# Patient Record
(demographics unavailable — no encounter records)

---

## 2025-03-05 NOTE — ASSESSMENT
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   New Mom - Amanda 12/22... doing great working PT  and happy with this   1) pSS:  SHAYY 1:640S w/ + SSA > 8.0 and low + RF 52, with ESR 59 and SPEP + polyclonal gammopathy with all other antibodies neg to include APS / LAC.  Presents with years of KCS (moderate + response to Xiidra w/ no recent inflammatory changes or abrasions), + sicca  mild with intermittent salivary/ submandibular and parotid gland enlargement (mild persisten- not active now) as well as recurrent (mild persistent) lymphadenopathy (not appreciated today), and most concerning evidence of RTA w/ recurrent renal calculi with mild persistent metabolic acidosis CO2 most recently 20-21..Most recent CO2 was 24 (11/23 stable).. doing better overall through change in stressors...and caring for self better.. marked improvement in diffuse mild arthralgias- Does not report obvious synovitis now or in past.    Significantly  denies mucositis in past though recent increase in oral apthous ulcers.. tolerable, no serositis, rashes, neuropathies with no fevers, chills. or GI sx.   Continues on HCQ (200 mg /d = 3.6 mg/kg) with last eye exam 6/23 ok to continue and check in 1yr   NOTE:  - Lymphadenopathy: reports hx of of it in axillary region and saw Heme/onc and was told most likely benign and can go for biopsy if prefers & states stable on recent US   2) Infertility: she is on Plaquenil but despite 3 ys of continued HCQ... still  unable to conceive.  Will not retry  3) Regional pain: migratory.. nothing overt at this time -  Lt trochanteric bursitis: excellent response  - R sided RTC pathology with POM but fROM.. in PT + response, doing better   - LBP: intermittent, not active at this time..  -Xray of LS spine to evaluate for structural changes, DDD, confirm SI normal  -Advil PRN w/ food needed sparingly as needed    Plan  - biotene mouthwash or toothpaste or spray PRN continue   - Complete labs every 6 months -orders on file before next visit   - Continue to take plaquenil as prescribed low dose 200 mg once daily  for now no need to increase   - renewing HCQ next eye exam is 6/2024- update needed annlually   - Call if symptoms change.  - All questions and concerns addressed to my best possible ability, verbally acknowledged understanding is agreeable with plan as stated  - rto in 6 months

## 2025-03-05 NOTE — PHYSICAL EXAM
[General Appearance - Alert] : alert [General Appearance - In No Acute Distress] : in no acute distress [General Appearance - Well Nourished] : well nourished [General Appearance - Well Developed] : well developed [General Appearance - Well-Appearing] : healthy appearing [Sclera] : the sclera and conjunctiva were normal [PERRL With Normal Accommodation] : pupils were equal in size, round, and reactive to light [Extraocular Movements] : extraocular movements were intact [Outer Ear] : the ears and nose were normal in appearance [Neck Appearance] : the appearance of the neck was normal [Respiration, Rhythm And Depth] : normal respiratory rhythm and effort [Exaggerated Use Of Accessory Muscles For Inspiration] : no accessory muscle use [Auscultation Breath Sounds / Voice Sounds] : lungs were clear to auscultation bilaterally [Heart Rate And Rhythm] : heart rate was normal and rhythm regular [Heart Sounds] : normal S1 and S2 [Heart Sounds Gallop] : no gallops [Murmurs] : no murmurs [Edema] : there was no peripheral edema [Cervical Lymph Nodes Enlarged Posterior Bilaterally] : posterior cervical [Cervical Lymph Nodes Enlarged Anterior Bilaterally] : anterior cervical [Supraclavicular Lymph Nodes Enlarged Bilaterally] : supraclavicular [No CVA Tenderness] : no ~M costovertebral angle tenderness [No Spinal Tenderness] : no spinal tenderness [] : no rash [Sensation] : the sensory exam was normal to light touch and pinprick [Motor Exam] : the motor exam was normal [No Focal Deficits] : no focal deficits [Impaired Insight] : insight and judgment were intact [Mood] : the mood was normal [Motor Tone] : muscle strength and tone were normal [FreeTextEntry1] : ; no synovitis or effusion on exam noted today- fROM throughout w/ no tender points or deformities

## 2025-03-05 NOTE — REVIEW OF SYSTEMS
[Feeling Tired] : feeling tired [Dry Eyes] : dryness of the eyes [As Noted in HPI] : as noted in HPI [Arthralgias] : arthralgias [Joint Pain] : joint pain [Joint Stiffness] : joint stiffness [Sleep Disturbances] : sleep disturbances [Negative] : Heme/Lymph [Fever] : no fever [Chills] : no chills [Eye Pain] : no eye pain [Red Eyes] : eyes not red [Nosebleeds] : no nosebleeds [Chest Pain] : no chest pain [Shortness Of Breath] : no shortness of breath [Abdominal Pain] : no abdominal pain [Dysuria] : no dysuria [Confused] : no confusion [Suicidal] : not suicidal [Anxiety] : no anxiety [Muscle Weakness] : no muscle weakness [Easy Bleeding] : no tendency for easy bleeding [FreeTextEntry2] : , energy level excellent routinely- better with new job and aging of her child, stable wt  [FreeTextEntry3] : Xiidra routinely-> PRN now(+ response) .  Rare corneal abrasions, nothing recently and denies inflammatory eye disease  [FreeTextEntry4] : sicca- oral mild w/ intermittent salivary/ parotid gland enlargement- mild persistent and recent increase in apthous ulcers.. oral only  [FreeTextEntry8] : RTA in past with renal calculi multple [FreeTextEntry9] : joint pain improved with no previous or current swelling- minimal stiffness less than 10 mins  [de-identified] : r/t ..- doing better overall

## 2025-03-05 NOTE — HISTORY OF PRESENT ILLNESS
[FreeTextEntry1] : 3/5/25 - since lat visit again another episode of severe renal calculi...required adjustmentin citrate .. now doing well.  NO acute obstruction this time.   updated labs 1/25 nl CBC, CMP w/ CO2 28, TSH, and UA - tr blood only.  - COntinues to live with LDN... but stable - still most persistent axillary.  no night sweats, fevers, chills or wt loss  - exercsing more.. work less stressful- PT ROS:  lives with chronic mild diffuse arthralgias but no overt inflammation, aMS < 10 mins, no muscle weakness.  MIld persistent oral/ opth dryness- no routine medication needed.  no cardiopulmonary sx, denies paresthesias   - kidney stone update: had 24 hr urine redone and everything was good and has since switched her care to a urologist  - seeing urologist Dr. Martinez Singh at [facility] who will monitor her status and requires 24 hr urine on a regular basis  - has a f/u w/ uro every 6 months  - on potassium citrate 20 MEQs BID - Regimen well tolerated, no SE/ TOx.... last eye exam 6.23 no contraindications to continued use.  Never did start cevimiline, wants to minimize medications   1) pSS 2) Regional pain:  L sided GTB R shoulder   ___________________________________________________________________________ Initial HPI 12/14/2022  37 year old female w/history of elevated SHAYY, + schirmer test, + Sjogren antibodies, lymphadenopathy, dry eyes, dry mouth, presenting in office to establish care w/ new rheumatologist. First discovered elevated SHAYY when she was 15-16 years old. She was experiencing severe stiffness along w/ recurrent kidney stones. A rheumatologic work up revealed nothing at the time. In the midst of her 8 year fertility bradford, she did additional rheumatologic workups that repeatedly came back unrevealing except for an elevated SHAYY and ESR. Eventually towards the end of her fertility treatments she tested positive for Sjogren antibodies simultaneously dx w/ RTA by urologist. Has been on Plaquenil with notable decrease in systemic inflammatory response   - Began Plaquenil October 2020.....had an adverse reaction to the plaquenil one month after beginning on the medication ("skin lost its mind")......was put on Accutane for 8 months (began to notice some hair shedding, but denies any bald patches)...Since being on plaquenil she hasn't notice much improvement except for a decreased ESR - Last eye exam was June 2022, everything was normal.........f/u w/ophthalmologist yearly  - Sjogrens: c/o diffuse lymphadenopathy throughout body since teenage years (most severe in axillary lymph nodes but include salivary glands and parotid enlargement)......dry eyes (not consistent w/ eye drops)...dry mouth...joint pain and stiffness most severe in neck and lower back (chronic but tolerable at a 2/10)....she has never notice joint swelling - She c/o fatigue, but attributes it to helping take care of her 2 week old daughter......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 Seen by hem/onc, did not feel lymph nodes required further evaluation.    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 - Medication: plaquenil 200 mg daily, biotene dry mouth gum, potassium citrate 10mg BID   1) pSS w/ RTA (recurrent renal calculi)   ___________________________________________________________________________________  36 y/o F here for follow up with me and was seeing my colleague, Dr. De Leon w/ Sjogren syndrome w/ + RO>8 w/ hx of +SHAYY 1:640 speckled. She also has hx of +RF. She reports of sicca symptoms of dry eyes on Xiidra w/ her Optho, Dr. Romain Bai controlled on it at this time.  States she notes dry mouth at times that is improved now.  States notes intermittent joint aches in the hands, knees and feet that are controlled on HCQ 200mg PO daily. Denies any swelling or redness or warmth of any joints. States optho exam on HCQ 7/2021 was ok and has f/u in 6 months.  States she has some achy pain rated 3/10 for severity on the left side of the hip w/ trouble sleeping on that side. Denies any groin/hip pain.  States she notes some LBP worse w/ walking or overuse; and better w/ stretching. Denies any loss of bladder or bowel incontinence or saddle anesthesias. Denies any fever/chills, no rashes, no ulcers, no raynaud's, no infectious diarrhea or  symptoms at this time.  She had undergone infertility treatment for past few yrs with various gynecologists. She has had one chemical pregnancy. She has been told that she had 2 abnormal embryos. She has also failed to implant while on baby aspirin. States she is deferring getting pregnant at this time.

## 2025-03-05 NOTE — HISTORY OF PRESENT ILLNESS
[FreeTextEntry1] : 3/5/25 - since lat visit again another episode of severe renal calculi...required adjustmentin citrate .. now doing well.  NO acute obstruction this time.   updated labs 1/25 nl CBC, CMP w/ CO2 28, TSH, and UA - tr blood only.  - COntinues to live with LDN... but stable - still most persistent axillary.  no night sweats, fevers, chills or wt loss  - exercsing more.. work less stressful- PT ROS:  lives with chronic mild diffuse arthralgias but no overt inflammation, aMS < 10 mins, no muscle weakness.  MIld persistent oral/ opth dryness- no routine medication needed.  no cardiopulmonary sx, denies paresthesias   - kidney stone update: had 24 hr urine redone and everything was good and has since switched her care to a urologist  - seeing urologist Dr. Martinez Singh at [facility] who will monitor her status and requires 24 hr urine on a regular basis  - has a f/u w/ uro every 6 months  - on potassium citrate 20 MEQs BID - Regimen well tolerated, no SE/ TOx.... last eye exam 6.23 no contraindications to continued use.  Never did start cevimiline, wants to minimize medications   1) pSS 2) Regional pain:  L sided GTB R shoulder   ___________________________________________________________________________ Initial HPI 12/14/2022  37 year old female w/history of elevated SHAYY, + schirmer test, + Sjogren antibodies, lymphadenopathy, dry eyes, dry mouth, presenting in office to establish care w/ new rheumatologist. First discovered elevated SHAYY when she was 15-16 years old. She was experiencing severe stiffness along w/ recurrent kidney stones. A rheumatologic work up revealed nothing at the time. In the midst of her 8 year fertility bradford, she did additional rheumatologic workups that repeatedly came back unrevealing except for an elevated SHAYY and ESR. Eventually towards the end of her fertility treatments she tested positive for Sjogren antibodies simultaneously dx w/ RTA by urologist. Has been on Plaquenil with notable decrease in systemic inflammatory response   - Began Plaquenil October 2020.....had an adverse reaction to the plaquenil one month after beginning on the medication ("skin lost its mind")......was put on Accutane for 8 months (began to notice some hair shedding, but denies any bald patches)...Since being on plaquenil she hasn't notice much improvement except for a decreased ESR - Last eye exam was June 2022, everything was normal.........f/u w/ophthalmologist yearly  - Sjogrens: c/o diffuse lymphadenopathy throughout body since teenage years (most severe in axillary lymph nodes but include salivary glands and parotid enlargement)......dry eyes (not consistent w/ eye drops)...dry mouth...joint pain and stiffness most severe in neck and lower back (chronic but tolerable at a 2/10)....she has never notice joint swelling - She c/o fatigue, but attributes it to helping take care of her 2 week old daughter......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 Seen by hem/onc, did not feel lymph nodes required further evaluation.    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 - Medication: plaquenil 200 mg daily, biotene dry mouth gum, potassium citrate 10mg BID   1) pSS w/ RTA (recurrent renal calculi)   ___________________________________________________________________________________  36 y/o F here for follow up with me and was seeing my colleague, Dr. De Leon w/ Sjogren syndrome w/ + RO>8 w/ hx of +SHAYY 1:640 speckled. She also has hx of +RF. She reports of sicca symptoms of dry eyes on Xiidra w/ her Optho, Dr. Romain Bai controlled on it at this time.  States she notes dry mouth at times that is improved now.  States notes intermittent joint aches in the hands, knees and feet that are controlled on HCQ 200mg PO daily. Denies any swelling or redness or warmth of any joints. States optho exam on HCQ 7/2021 was ok and has f/u in 6 months.  States she has some achy pain rated 3/10 for severity on the left side of the hip w/ trouble sleeping on that side. Denies any groin/hip pain.  States she notes some LBP worse w/ walking or overuse; and better w/ stretching. Denies any loss of bladder or bowel incontinence or saddle anesthesias. Denies any fever/chills, no rashes, no ulcers, no raynaud's, no infectious diarrhea or  symptoms at this time.  She had undergone infertility treatment for past few yrs with various gynecologists. She has had one chemical pregnancy. She has been told that she had 2 abnormal embryos. She has also failed to implant while on baby aspirin. States she is deferring getting pregnant at this time.

## 2025-03-05 NOTE — REVIEW OF SYSTEMS
[Feeling Tired] : feeling tired [Dry Eyes] : dryness of the eyes [As Noted in HPI] : as noted in HPI [Arthralgias] : arthralgias [Joint Pain] : joint pain [Joint Stiffness] : joint stiffness [Sleep Disturbances] : sleep disturbances [Negative] : Heme/Lymph [Fever] : no fever [Chills] : no chills [Eye Pain] : no eye pain [Red Eyes] : eyes not red [Nosebleeds] : no nosebleeds [Chest Pain] : no chest pain [Shortness Of Breath] : no shortness of breath [Abdominal Pain] : no abdominal pain [Dysuria] : no dysuria [Confused] : no confusion [Suicidal] : not suicidal [Anxiety] : no anxiety [Muscle Weakness] : no muscle weakness [Easy Bleeding] : no tendency for easy bleeding [FreeTextEntry2] : , energy level excellent routinely- better with new job and aging of her child, stable wt  [FreeTextEntry3] : Xiidra routinely-> PRN now(+ response) .  Rare corneal abrasions, nothing recently and denies inflammatory eye disease  [FreeTextEntry4] : sicca- oral mild w/ intermittent salivary/ parotid gland enlargement- mild persistent and recent increase in apthous ulcers.. oral only  [FreeTextEntry8] : RTA in past with renal calculi multple [FreeTextEntry9] : joint pain improved with no previous or current swelling- minimal stiffness less than 10 mins  [de-identified] : r/t ..- doing better overall

## 2025-03-05 NOTE — PHYSICAL EXAM
[General Appearance - Alert] : alert [General Appearance - In No Acute Distress] : in no acute distress [General Appearance - Well Nourished] : well nourished [General Appearance - Well-Appearing] : healthy appearing [General Appearance - Well Developed] : well developed [Sclera] : the sclera and conjunctiva were normal [PERRL With Normal Accommodation] : pupils were equal in size, round, and reactive to light [Extraocular Movements] : extraocular movements were intact [Outer Ear] : the ears and nose were normal in appearance [Neck Appearance] : the appearance of the neck was normal [Respiration, Rhythm And Depth] : normal respiratory rhythm and effort [Exaggerated Use Of Accessory Muscles For Inspiration] : no accessory muscle use [Auscultation Breath Sounds / Voice Sounds] : lungs were clear to auscultation bilaterally [Heart Rate And Rhythm] : heart rate was normal and rhythm regular [Heart Sounds] : normal S1 and S2 [Heart Sounds Gallop] : no gallops [Murmurs] : no murmurs [Edema] : there was no peripheral edema [Cervical Lymph Nodes Enlarged Posterior Bilaterally] : posterior cervical [Cervical Lymph Nodes Enlarged Anterior Bilaterally] : anterior cervical [Supraclavicular Lymph Nodes Enlarged Bilaterally] : supraclavicular [No CVA Tenderness] : no ~M costovertebral angle tenderness [No Spinal Tenderness] : no spinal tenderness [] : no rash [Sensation] : the sensory exam was normal to light touch and pinprick [Motor Exam] : the motor exam was normal [No Focal Deficits] : no focal deficits [Impaired Insight] : insight and judgment were intact [Mood] : the mood was normal [Motor Tone] : muscle strength and tone were normal [FreeTextEntry1] : ; no synovitis or effusion on exam noted today- fROM throughout w/ no tender points or deformities